# Patient Record
Sex: FEMALE | Race: BLACK OR AFRICAN AMERICAN | NOT HISPANIC OR LATINO
[De-identification: names, ages, dates, MRNs, and addresses within clinical notes are randomized per-mention and may not be internally consistent; named-entity substitution may affect disease eponyms.]

---

## 2020-03-29 ENCOUNTER — TRANSCRIPTION ENCOUNTER (OUTPATIENT)
Age: 20
End: 2020-03-29

## 2020-03-29 ENCOUNTER — INPATIENT (INPATIENT)
Facility: HOSPITAL | Age: 20
LOS: 0 days | Discharge: HOME | End: 2020-03-30
Attending: OBSTETRICS & GYNECOLOGY | Admitting: OBSTETRICS & GYNECOLOGY

## 2020-03-29 ENCOUNTER — EMERGENCY (EMERGENCY)
Facility: HOSPITAL | Age: 20
LOS: 1 days | Discharge: STILL ADMITTED OR ADMINISTRATIVE | End: 2020-03-29
Admitting: EMERGENCY MEDICINE

## 2020-03-29 VITALS
SYSTOLIC BLOOD PRESSURE: 111 MMHG | HEART RATE: 86 BPM | DIASTOLIC BLOOD PRESSURE: 79 MMHG | RESPIRATION RATE: 16 BRPM | TEMPERATURE: 99 F

## 2020-03-29 VITALS
OXYGEN SATURATION: 100 % | DIASTOLIC BLOOD PRESSURE: 64 MMHG | SYSTOLIC BLOOD PRESSURE: 113 MMHG | HEART RATE: 73 BPM | RESPIRATION RATE: 16 BRPM | TEMPERATURE: 98 F

## 2020-03-29 DIAGNOSIS — O26.899 OTHER SPECIFIED PREGNANCY RELATED CONDITIONS, UNSPECIFIED TRIMESTER: ICD-10-CM

## 2020-03-29 DIAGNOSIS — Z3A.00 WEEKS OF GESTATION OF PREGNANCY NOT SPECIFIED: ICD-10-CM

## 2020-03-29 LAB
BASOPHILS # BLD AUTO: 0.04 K/UL — SIGNIFICANT CHANGE UP (ref 0–0.2)
BASOPHILS NFR BLD AUTO: 0.4 % — SIGNIFICANT CHANGE UP (ref 0–2)
BLD GP AB SCN SERPL QL: NEGATIVE — SIGNIFICANT CHANGE UP
EOSINOPHIL # BLD AUTO: 0.24 K/UL — SIGNIFICANT CHANGE UP (ref 0–0.5)
EOSINOPHIL NFR BLD AUTO: 2.3 % — SIGNIFICANT CHANGE UP (ref 0–6)
HCT VFR BLD CALC: 39.6 % — SIGNIFICANT CHANGE UP (ref 34.5–45)
HGB BLD-MCNC: 12.9 G/DL — SIGNIFICANT CHANGE UP (ref 11.5–15.5)
IMM GRANULOCYTES NFR BLD AUTO: 0.5 % — SIGNIFICANT CHANGE UP (ref 0–1.5)
LYMPHOCYTES # BLD AUTO: 1.57 K/UL — SIGNIFICANT CHANGE UP (ref 1–3.3)
LYMPHOCYTES # BLD AUTO: 14.8 % — SIGNIFICANT CHANGE UP (ref 13–44)
MCHC RBC-ENTMCNC: 28.7 PG — SIGNIFICANT CHANGE UP (ref 27–34)
MCHC RBC-ENTMCNC: 32.6 % — SIGNIFICANT CHANGE UP (ref 32–36)
MCV RBC AUTO: 88.2 FL — SIGNIFICANT CHANGE UP (ref 80–100)
MONOCYTES # BLD AUTO: 1.26 K/UL — HIGH (ref 0–0.9)
MONOCYTES NFR BLD AUTO: 11.9 % — SIGNIFICANT CHANGE UP (ref 2–14)
NEUTROPHILS # BLD AUTO: 7.47 K/UL — HIGH (ref 1.8–7.4)
NEUTROPHILS NFR BLD AUTO: 70.1 % — SIGNIFICANT CHANGE UP (ref 43–77)
NRBC # FLD: 0 K/UL — SIGNIFICANT CHANGE UP (ref 0–0)
PLATELET # BLD AUTO: 209 K/UL — SIGNIFICANT CHANGE UP (ref 150–400)
PMV BLD: 10.6 FL — SIGNIFICANT CHANGE UP (ref 7–13)
RBC # BLD: 4.49 M/UL — SIGNIFICANT CHANGE UP (ref 3.8–5.2)
RBC # FLD: 14.1 % — SIGNIFICANT CHANGE UP (ref 10.3–14.5)
RH IG SCN BLD-IMP: POSITIVE — SIGNIFICANT CHANGE UP
RH IG SCN BLD-IMP: POSITIVE — SIGNIFICANT CHANGE UP
WBC # BLD: 10.63 K/UL — HIGH (ref 3.8–10.5)
WBC # FLD AUTO: 10.63 K/UL — HIGH (ref 3.8–10.5)

## 2020-03-29 RX ORDER — GLYCERIN ADULT
1 SUPPOSITORY, RECTAL RECTAL AT BEDTIME
Refills: 0 | Status: DISCONTINUED | OUTPATIENT
Start: 2020-03-29 | End: 2020-03-30

## 2020-03-29 RX ORDER — OXYCODONE HYDROCHLORIDE 5 MG/1
5 TABLET ORAL ONCE
Refills: 0 | Status: DISCONTINUED | OUTPATIENT
Start: 2020-03-29 | End: 2020-03-30

## 2020-03-29 RX ORDER — MAGNESIUM HYDROXIDE 400 MG/1
30 TABLET, CHEWABLE ORAL
Refills: 0 | Status: DISCONTINUED | OUTPATIENT
Start: 2020-03-29 | End: 2020-03-30

## 2020-03-29 RX ORDER — DIBUCAINE 1 %
1 OINTMENT (GRAM) RECTAL EVERY 6 HOURS
Refills: 0 | Status: DISCONTINUED | OUTPATIENT
Start: 2020-03-29 | End: 2020-03-30

## 2020-03-29 RX ORDER — IBUPROFEN 200 MG
1 TABLET ORAL
Qty: 0 | Refills: 0 | DISCHARGE
Start: 2020-03-29

## 2020-03-29 RX ORDER — LANOLIN
1 OINTMENT (GRAM) TOPICAL EVERY 6 HOURS
Refills: 0 | Status: DISCONTINUED | OUTPATIENT
Start: 2020-03-29 | End: 2020-03-30

## 2020-03-29 RX ORDER — DIPHENHYDRAMINE HCL 50 MG
25 CAPSULE ORAL EVERY 6 HOURS
Refills: 0 | Status: DISCONTINUED | OUTPATIENT
Start: 2020-03-29 | End: 2020-03-30

## 2020-03-29 RX ORDER — IBUPROFEN 200 MG
600 TABLET ORAL EVERY 6 HOURS
Refills: 0 | Status: COMPLETED | OUTPATIENT
Start: 2020-03-29 | End: 2021-02-25

## 2020-03-29 RX ORDER — SODIUM CHLORIDE 9 MG/ML
3 INJECTION INTRAMUSCULAR; INTRAVENOUS; SUBCUTANEOUS EVERY 8 HOURS
Refills: 0 | Status: DISCONTINUED | OUTPATIENT
Start: 2020-03-29 | End: 2020-03-30

## 2020-03-29 RX ORDER — TETANUS TOXOID, REDUCED DIPHTHERIA TOXOID AND ACELLULAR PERTUSSIS VACCINE, ADSORBED 5; 2.5; 8; 8; 2.5 [IU]/.5ML; [IU]/.5ML; UG/.5ML; UG/.5ML; UG/.5ML
0.5 SUSPENSION INTRAMUSCULAR ONCE
Refills: 0 | Status: DISCONTINUED | OUTPATIENT
Start: 2020-03-29 | End: 2020-03-30

## 2020-03-29 RX ORDER — OXYCODONE HYDROCHLORIDE 5 MG/1
5 TABLET ORAL
Refills: 0 | Status: DISCONTINUED | OUTPATIENT
Start: 2020-03-29 | End: 2020-03-30

## 2020-03-29 RX ORDER — ACETAMINOPHEN 500 MG
975 TABLET ORAL
Refills: 0 | Status: DISCONTINUED | OUTPATIENT
Start: 2020-03-29 | End: 2020-03-30

## 2020-03-29 RX ORDER — SIMETHICONE 80 MG/1
80 TABLET, CHEWABLE ORAL EVERY 4 HOURS
Refills: 0 | Status: DISCONTINUED | OUTPATIENT
Start: 2020-03-29 | End: 2020-03-30

## 2020-03-29 RX ORDER — KETOROLAC TROMETHAMINE 30 MG/ML
30 SYRINGE (ML) INJECTION ONCE
Refills: 0 | Status: DISCONTINUED | OUTPATIENT
Start: 2020-03-29 | End: 2020-03-29

## 2020-03-29 RX ORDER — HYDROCORTISONE 1 %
1 OINTMENT (GRAM) TOPICAL EVERY 6 HOURS
Refills: 0 | Status: DISCONTINUED | OUTPATIENT
Start: 2020-03-29 | End: 2020-03-30

## 2020-03-29 RX ORDER — OXYTOCIN 10 UNIT/ML
VIAL (ML) INJECTION
Qty: 20 | Refills: 0 | Status: DISCONTINUED | OUTPATIENT
Start: 2020-03-29 | End: 2020-03-29

## 2020-03-29 RX ORDER — SODIUM CHLORIDE 9 MG/ML
1000 INJECTION, SOLUTION INTRAVENOUS
Refills: 0 | Status: DISCONTINUED | OUTPATIENT
Start: 2020-03-29 | End: 2020-03-29

## 2020-03-29 RX ORDER — OXYTOCIN 10 UNIT/ML
333.33 VIAL (ML) INJECTION
Qty: 20 | Refills: 0 | Status: DISCONTINUED | OUTPATIENT
Start: 2020-03-29 | End: 2020-03-30

## 2020-03-29 RX ORDER — PRAMOXINE HYDROCHLORIDE 150 MG/15G
1 AEROSOL, FOAM RECTAL EVERY 4 HOURS
Refills: 0 | Status: DISCONTINUED | OUTPATIENT
Start: 2020-03-29 | End: 2020-03-30

## 2020-03-29 RX ORDER — BENZOCAINE 10 %
1 GEL (GRAM) MUCOUS MEMBRANE EVERY 6 HOURS
Refills: 0 | Status: DISCONTINUED | OUTPATIENT
Start: 2020-03-29 | End: 2020-03-30

## 2020-03-29 RX ORDER — IBUPROFEN 200 MG
600 TABLET ORAL EVERY 6 HOURS
Refills: 0 | Status: DISCONTINUED | OUTPATIENT
Start: 2020-03-29 | End: 2020-03-30

## 2020-03-29 RX ORDER — AER TRAVELER 0.5 G/1
1 SOLUTION RECTAL; TOPICAL EVERY 4 HOURS
Refills: 0 | Status: DISCONTINUED | OUTPATIENT
Start: 2020-03-29 | End: 2020-03-30

## 2020-03-29 RX ADMIN — Medication 30 MILLIGRAM(S): at 11:33

## 2020-03-29 RX ADMIN — Medication 30 MILLIGRAM(S): at 11:05

## 2020-03-29 RX ADMIN — Medication 1000 MILLIUNIT(S)/MIN: at 11:20

## 2020-03-29 NOTE — ED ADULT TRIAGE NOTE - CHIEF COMPLAINT QUOTE
has labor pains and rupture of membranes. States contractions are 6minutes apart. Denies vaginal bleeding, any other medical complaints. Denies SOB/cough/fevers/chills. MD Early cleared for L&D transport. L&D triage RN aware of incoming patient. Sent up with EDT.

## 2020-03-29 NOTE — CHART NOTE - NSCHARTNOTEFT_GEN_A_CORE
R1 Labor Note    Pt examined for increased rectal pressure    VS  T(C): 36.7 (03-29-20 @ 08:00)  HR: 94 (03-29-20 @ 08:38)  BP: 108/52 (03-29-20 @ 08:31)  RR: 18 (03-29-20 @ 06:15)  SpO2: 100% (03-29-20 @ 08:38)    FHT: cat 1  [place]: q2 min  SVE 8.5/100/-1    A/P:  ROM1a  exp management  peanut ball  expect vaginal delivery    C. Diamant, PGY-1  d/w Eliseo, en route to hospital

## 2020-03-29 NOTE — OB RN DELIVERY SUMMARY - NS_SEPSISRSKCALC_OBGYN_ALL_OB_FT
EOS calculated successfully. EOS Risk Factor: 0.5/1000 live births ([facility] national incidence); GA=40w3d; Temp=99.14; ROM=8.367; GBS='Unknown'; Antibiotics='No antibiotics or any antibiotics < 2 hrs prior to birth'

## 2020-03-29 NOTE — DISCHARGE NOTE OB - HOSPITAL COURSE
patient presented with ruptured membranes. Labor induced. Uncomplicated vaginal delivery of liveborn female infant.

## 2020-03-29 NOTE — DISCHARGE NOTE OB - PATIENT PORTAL LINK FT
You can access the FollowMyHealth Patient Portal offered by [facility] by registering at the following website: [email or web address]. By joining [employer]’s FollowMyHealth portal, you will also be able to view your health information using other applications (apps) compatible with our system.

## 2020-03-29 NOTE — CHART NOTE - NSCHARTNOTEFT_GEN_A_CORE
R1 OB Labor Note    S: Patient seen and examined at bedside.     Vital Signs Last 24 Hrs  T(C): 37 (29 Mar 2020 04:22), Max: 37.3 (29 Mar 2020 02:57)  T(F): 98.6 (29 Mar 2020 04:22), Max: 99.14 (29 Mar 2020 03:06)  HR: 71 (29 Mar 2020 04:22) (71 - 86)  BP: 131/55 (29 Mar 2020 04:22) (111/79 - 131/55)  BP(mean): --  RR: 16 (29 Mar 2020 04:22) (16 - 16)  SpO2: 100% (29 Mar 2020 02:34) (100% - 100%)    FHT: 140, mod reji, +accels, no decels  [place]: q3-5m  SVE: 2/90/-3    A/P:   - EM, ROM@1a  - PT not requesting epidural at this time  - CTM      [place]ney PGY-1  d/w Dr. Deleon PGY-4

## 2020-03-29 NOTE — OB PROVIDER TRIAGE NOTE - NSHPPHYSICALEXAM_GEN_ALL_CORE
T(C): 37.3 (03-29-20 @ 03:06), Max: 37.3 (03-29-20 @ 02:57)  HR: 86 (03-29-20 @ 03:05) (73 - 86)  BP: 111/79 (03-29-20 @ 03:05) (111/79 - 113/64)  RR: 16 (03-29-20 @ 02:57) (16 - 16)  SpO2: 100% (03-29-20 @ 02:34) (100% - 100%)    Heart: RRR  Lungs: CTA  Abdomen: Gravid, soft, NT    NST: Reactive with moderate variability, Category 1 tracing  [place]: Regular contractions  VE: 1/90/-2, Gross ROM, clear fluid

## 2020-03-29 NOTE — CHART NOTE - NSCHARTNOTEFT_GEN_A_CORE
R1 Labor Note    Pt examined for increased rectal pressure   10/100/+2  will start pushing    CElaina Miller, PGY-1  d/w Eliseo

## 2020-03-29 NOTE — OB PROVIDER DELIVERY SUMMARY - NSPROVIDERDELIVERYNOTE_OBGYN_ALL_OB_FT
Spontaneous vaginal delivery of liveborn infant from OA position. Head, shoulders and body were delivered easily. Infant was suctioned. No Mec. Cord was clamped and cut and infant was passed to pediatrics. Placenta was delivered intact with 3 vessel cord. Fundal massage was given and uterine fundus was found to be firm. Vaginal exam revealed an intact cervix, vaginal walls and sulci. Patient had a 1st degree laceration in the perineum that was repaired with 2.0 chromic suture. Excellent hemostasis was noted. Patient stable. Count was correct x2.

## 2020-03-29 NOTE — OB PROVIDER TRIAGE NOTE - NSOBPROVIDERNOTE_OBGYN_ALL_OB_FT
20y black female  at 40w3d presents to triage c/o LOF since 1:15am today  Reports +FM, no vaginal bleeding, no ROM or LOF  Prenatal care with Dr Bello  GBS: Unknown    GYN: Denies any h/o STDs, fibroids, ovarian Cyst, or abnormal pap smear  OBH:   PAST MEDICAL: Denies  PAST SURGICAL HISTORY: Denies    No Known Allergies    MEDICATIONS  (STANDING):  lactated ringers. 1000 milliLiter(s) (125 mL/Hr) IV Continuous <Continuous>  oxytocin Infusion  milliUNIT(s)/Min (1000 mL/Hr) IV Continuous <Continuous>    MEDICATIONS: PNV    T(C): 37.3 (20 @ 03:06), Max: 37.3 (20 @ 02:57)  HR: 86 (20 @ 03:05) (73 - 86)  BP: 111/79 (20 @ 03:05) (111/79 - 113/64)  RR: 16 (20 @ 02:57) (16 - 16)  SpO2: 100% (20 @ 02:34) (100% - 100%)    Heart: RRR  Lungs: CTA  Abdomen: Gravid, soft, NT    NST: Reactive with moderate variability, Category 1 tracing  [place]: Regular contractions  VE: 1/90/-2, Gross ROM, clear fluid    A/P: 20y black female  at 40w3d with PROM  D/w Dr Alford  -Admit to labor and delivery  -Cont EFM/[place]  -Admission labs: CBC, RPR, T&S  -IV hydration  -Clear liquid diet  -Cytotec or Pitocin for IOL

## 2020-03-29 NOTE — OB NEONATOLOGY/PEDIATRICIAN DELIVERY SUMMARY - NSPEDSNEONOTESA_OBGYN_ALL_OB_FT
40.3 weeks gestation infant delivered to a 20 yr old O+ mother with unremarkable medical and OB hx. Prenatal labs : GBS unknown, HIV neg, RPR non-reactive, HBsAg neg, rubella immune. SROM to clear fluid on 2/29 at 0115 to clear fluid. Tmax 37.0. Infant delivered vertex initially looked stunned. Dried, stimulated and suctioned. Color, tone and respiratory effort improved markedly. Apgars 8/9. Mother plans to breastfeed, and would like her infant to received Hep B vaccine. Manuela Giron 40.3 weeks gestation infant delivered to a 20 yr old O+ mother with unremarkable medical and OB hx. Prenatal labs : GBS unknown, HIV neg, RPR non-reactive, HBsAg neg, rubella immune. SROM to clear fluid on 2/29 at 0115 to clear fluid. Tmax 37.0. Infant delivered vertex initially looked stunned. Dried, stimulated and suctioned. Color, tone and respiratory effort improved markedly. Apgars 8/9. Mother plans to breastfeed, and would like her infant to received Hep B vaccine. Manuela Giron. EOS 0.15

## 2020-03-29 NOTE — DISCHARGE NOTE OB - CARE PROVIDER_API CALL
Eugenia Gomes (MD)  Obstetrics and Gynecology  [id number] [place] [address]  Phone: [phone]  Fax: [phone]  Established Patient  Follow Up Time: Routine

## 2020-03-29 NOTE — DISCHARGE NOTE OB - MATERIALS PROVIDED
Guide to Postpartum Care/[place]  Immunization Record/[facility] Hearing Screen Program/Shaken Baby Prevention Handout/[facility]  Screening Program/Tdap Vaccination (VIS Pub Date: 2012)/Breastfeeding Log/Back To Sleep Handout/Breastfeeding Guide and Packet/Discharge Medication Information for Patients and Families Pocket Guide

## 2020-03-29 NOTE — OB PROVIDER TRIAGE NOTE - HISTORY OF PRESENT ILLNESS
20y black female  at 40w3d presents to triage c/o LOF since 1:15am today  Reports +FM, no vaginal bleeding, no ROM or LOF  Prenatal care with Dr Bello  GBS: Unknown

## 2020-03-30 VITALS
SYSTOLIC BLOOD PRESSURE: 111 MMHG | DIASTOLIC BLOOD PRESSURE: 65 MMHG | OXYGEN SATURATION: 100 % | TEMPERATURE: 98 F | RESPIRATION RATE: 18 BRPM | HEART RATE: 77 BPM

## 2020-03-30 LAB — T PALLIDUM AB TITR SER: NEGATIVE — SIGNIFICANT CHANGE UP

## 2020-03-30 NOTE — PROGRESS NOTE ADULT - SUBJECTIVE AND OBJECTIVE BOX
ANESTHESIA POST-EPIDURAL CHECK    20y Female s/p  DAY 1     No COMPLAINTS    NO APPARENT ANESTHESIA COMPLICATIONS

## 2020-03-30 NOTE — PROGRESS NOTE ADULT - SUBJECTIVE AND OBJECTIVE BOX
S: Patient doing well. Minimal lochia. Pain controlled. O: Vital Signs Last 24 Hrs  T(C): 36.9 (29 Mar 2020 22:43), Max: 37.2 (29 Mar 2020 10:03)  T(F): 98.4 (29 Mar 2020 22:43), Max: 99 (29 Mar 2020 10:03)  HR: 80 (29 Mar 2020 18:59) (59 - 141)  BP: 110/57 (29 Mar 2020 18:59) (92/50 - 142/65)  BP(mean): --  RR: 18 (29 Mar 2020 22:43) (17 - 18)  SpO2: 100% (29 Mar 2020 22:43) (88% - 100%)    Gen: NAD  Abd: soft, NT, ND, fundus firm below umbilicus  Lochia: moderate  Ext: no tenderness    Labs:                        12.9   10.63 )-----------( 209      ( 29 Mar 2020 04:00 )             39.6       A: 20y PPD#1 s/p  doing well.     Plan: Continue routine postpartum care.           Discharge home

## 2020-04-18 NOTE — OB PROVIDER H&P - NSHPPHYSICALEXAM_GEN_ALL_CORE
T(C): 37.3 (03-29-20 @ 03:06), Max: 37.3 (03-29-20 @ 02:57)  HR: 86 (03-29-20 @ 03:05) (73 - 86)  BP: 111/79 (03-29-20 @ 03:05) (111/79 - 113/64)  RR: 16 (03-29-20 @ 02:57) (16 - 16)  SpO2: 100% (03-29-20 @ 02:34) (100% - 100%)    Heart: RRR  Lungs: CTA  Abdomen: Gravid, soft, NT    NST: Reactive with moderate variability, Category 1 tracing  [place]: Regular contractions  VE: 1/90/-2, Gross ROM, clear fluid

## 2020-04-18 NOTE — OB PROVIDER H&P - HISTORY OF PRESENT ILLNESS
20y black female  at 40w3d presents to triage c/o LOF since 1:15am today  Reports +FM, no vaginal bleeding, no ROM or LOF  Prenatal care with Dr Gomes  GBS: Unknown

## 2020-04-18 NOTE — OB PROVIDER H&P - ASSESSMENT
20y black female  at 40w3d presents to triage c/o LOF since 1:15am today  Reports +FM, no vaginal bleeding, no ROM or LOF  Prenatal care with Dr Gomes  GBS: Unknown    GYN: Denies any h/o STDs, fibroids, ovarian Cyst, or abnormal pap smear  OBH:   PAST MEDICAL: Denies  PAST SURGICAL HISTORY: Denies    No Known Allergies    MEDICATIONS  (STANDING):  lactated ringers. 1000 milliLiter(s) (125 mL/Hr) IV Continuous <Continuous>  oxytocin Infusion  milliUNIT(s)/Min (1000 mL/Hr) IV Continuous <Continuous>    MEDICATIONS: PNV    T(C): 37.3 (20 @ 03:06), Max: 37.3 (20 @ 02:57)  HR: 86 (20 @ 03:05) (73 - 86)  BP: 111/79 (20 @ 03:05) (111/79 - 113/64)  RR: 16 (20 @ 02:57) (16 - 16)  SpO2: 100% (20 @ 02:34) (100% - 100%)    Heart: RRR  Lungs: CTA  Abdomen: Gravid, soft, NT    NST: Reactive with moderate variability, Category 1 tracing  [place]: Regular contractions  VE: 1/90/-2, Gross ROM, clear fluid    A/P: 20y black female  at 40w3d with PROM  D/w Dr Alford  -Admit to labor and delivery  -Cont EFM/[place]  -Admission labs: CBC, RPR, T&S  -IV hydration  -Clear liquid diet  -Cytotec for IOL

## 2020-06-17 NOTE — DISCHARGE NOTE OB - MEDICATION SUMMARY - MEDICATIONS TO CHANGE
I will SWITCH the dose or number of times a day I take the medications listed below when I get home from the hospital:  None  used

## 2021-02-03 ENCOUNTER — NON-APPOINTMENT (OUTPATIENT)
Age: 21
End: 2021-02-03

## 2021-02-03 PROBLEM — Z78.9 OTHER SPECIFIED HEALTH STATUS: Chronic | Status: ACTIVE | Noted: 2020-03-29

## 2021-02-08 ENCOUNTER — NON-APPOINTMENT (OUTPATIENT)
Age: 21
End: 2021-02-08

## 2021-02-08 ENCOUNTER — APPOINTMENT (OUTPATIENT)
Dept: DERMATOLOGY | Facility: CLINIC | Age: 21
End: 2021-02-08
Payer: MEDICAID

## 2021-02-08 ENCOUNTER — TRANSCRIPTION ENCOUNTER (OUTPATIENT)
Age: 21
End: 2021-02-08

## 2021-02-08 DIAGNOSIS — B36.0 PITYRIASIS VERSICOLOR: ICD-10-CM

## 2021-02-08 PROBLEM — Z00.00 ENCOUNTER FOR PREVENTIVE HEALTH EXAMINATION: Status: ACTIVE | Noted: 2021-02-08

## 2021-02-08 PROCEDURE — 99072 ADDL SUPL MATRL&STAF TM PHE: CPT

## 2021-02-08 PROCEDURE — 99203 OFFICE O/P NEW LOW 30 MIN: CPT

## 2021-02-08 RX ORDER — KETOCONAZOLE 20.5 MG/ML
2 SHAMPOO, SUSPENSION TOPICAL
Qty: 1 | Refills: 6 | Status: ACTIVE | COMMUNITY
Start: 2021-02-08 | End: 1900-01-01

## 2023-10-22 NOTE — OB PROVIDER H&P - NS_MATERNALTRANS_OBGYN_ALL_OB
Orientation to room/Bed in low position, brakes on/Side rails x 2 or 4 up, assess large gaps, such that a patient could get extremity or other body part entrapped, use additional safety procedures/Call light is within reach, educate patient/family on its functionality No

## 2025-04-22 NOTE — OB RN DELIVERY SUMMARY - NSDELIVERYTYPEA_OBGYN_ALL_OB
Patient is in the supine position.   The body was positioned using the following devices: safety strap and gel pad mattress.  The head was positioned using the following devices: regular pillow.  The left arm was positioned using the following devices: arm board.  The right arm was positioned using the following devices: arm board.  The left leg was positioned using the following devices: blanket roll.  The right leg was positioned usin g the following devices: blanket roll. Vaginal Delivery

## 2025-06-12 NOTE — OB NEONATOLOGY/PEDIATRICIAN DELIVERY SUMMARY - NS_BIRTHTRAUMAA_OBGYN_ALL_OB
PATIENT INFORMATION    Anticipatory guidance discussed  Importance of regular dental care  Importance of regular exercise  Importance of varied diet  Library card; limit TV, media violence  Minimize junk food    Follow-Up  - Return for your annual well child visit.    Health and Safety Tips - The following hyperlinks are available to access via [address]    Parent Education from [employer]    Educación para padres sobre niños sanos    Common dosing for acetaminophen and ibuprofen:   Acetaminophen (Tylenol) can be given every 4 hours.  Children's Elixir: 160mg/5ml  Weight 36-47 lbs 48-59 lbs 60-71 lsb 72-95 lbs   Dose 7.5 ml 10 ml 12.5 ml 15 ml     Ibuprofen (Motrin) can be given every 6 hours.  Children's Elixir 100mg/5ml   Weight 36-47 lbs 48-59 lbs 60-71 lbs 72-95 lbs   Dose 7.5 ml 10 ml 12.5 ml 15 ml     Additional Educational Resources:  For additional resources regarding your symptoms, diagnosis, or further health information, please visit the Discover a Healthier You section on [email or web address] or the Online Health Resources section in [address].     None